# Patient Record
Sex: FEMALE | Race: WHITE | NOT HISPANIC OR LATINO
[De-identification: names, ages, dates, MRNs, and addresses within clinical notes are randomized per-mention and may not be internally consistent; named-entity substitution may affect disease eponyms.]

---

## 2022-12-30 PROBLEM — Z00.00 ENCOUNTER FOR PREVENTIVE HEALTH EXAMINATION: Status: ACTIVE | Noted: 2022-12-30

## 2023-01-04 ENCOUNTER — APPOINTMENT (OUTPATIENT)
Dept: SURGERY | Facility: CLINIC | Age: 33
End: 2023-01-04
Payer: COMMERCIAL

## 2023-01-04 ENCOUNTER — OUTPATIENT (OUTPATIENT)
Dept: OUTPATIENT SERVICES | Facility: HOSPITAL | Age: 33
LOS: 1 days | Discharge: HOME | End: 2023-01-04
Payer: COMMERCIAL

## 2023-01-04 ENCOUNTER — OUTPATIENT (OUTPATIENT)
Dept: OUTPATIENT SERVICES | Facility: HOSPITAL | Age: 33
LOS: 1 days | Discharge: HOME | End: 2023-01-04

## 2023-01-04 VITALS
BODY MASS INDEX: 32.44 KG/M2 | WEIGHT: 190 LBS | TEMPERATURE: 97.5 F | HEIGHT: 64 IN | HEART RATE: 83 BPM | OXYGEN SATURATION: 99 % | SYSTOLIC BLOOD PRESSURE: 122 MMHG | DIASTOLIC BLOOD PRESSURE: 86 MMHG

## 2023-01-04 DIAGNOSIS — R94.31 ABNORMAL ELECTROCARDIOGRAM [ECG] [EKG]: ICD-10-CM

## 2023-01-04 DIAGNOSIS — K80.20 CALCULUS OF GALLBLADDER WITHOUT CHOLECYSTITIS WITHOUT OBSTRUCTION: ICD-10-CM

## 2023-01-04 PROCEDURE — 71046 X-RAY EXAM CHEST 2 VIEWS: CPT | Mod: 26

## 2023-01-04 PROCEDURE — 99203 OFFICE O/P NEW LOW 30 MIN: CPT

## 2023-01-04 PROCEDURE — 93010 ELECTROCARDIOGRAM REPORT: CPT

## 2023-01-05 ENCOUNTER — NON-APPOINTMENT (OUTPATIENT)
Age: 33
End: 2023-01-05

## 2023-01-05 LAB
ALBUMIN SERPL ELPH-MCNC: 4.6 G/DL
ALP BLD-CCNC: 63 U/L
ALT SERPL-CCNC: 18 U/L
ANION GAP SERPL CALC-SCNC: 15 MMOL/L
AST SERPL-CCNC: 17 U/L
BASOPHILS # BLD AUTO: 0.05 K/UL
BASOPHILS NFR BLD AUTO: 0.6 %
BILIRUB DIRECT SERPL-MCNC: <0.2 MG/DL
BILIRUB INDIRECT SERPL-MCNC: >0.2 MG/DL
BILIRUB SERPL-MCNC: 0.4 MG/DL
BUN SERPL-MCNC: 13 MG/DL
CALCIUM SERPL-MCNC: 9.5 MG/DL
CHLORIDE SERPL-SCNC: 103 MMOL/L
CO2 SERPL-SCNC: 20 MMOL/L
CREAT SERPL-MCNC: 0.6 MG/DL
EGFR: 121 ML/MIN/1.73M2
EOSINOPHIL # BLD AUTO: 0.1 K/UL
EOSINOPHIL NFR BLD AUTO: 1.2 %
GLUCOSE SERPL-MCNC: 80 MG/DL
HCT VFR BLD CALC: 42.4 %
HGB BLD-MCNC: 13.5 G/DL
IMM GRANULOCYTES NFR BLD AUTO: 0.2 %
INR PPP: 1.02 RATIO
LYMPHOCYTES # BLD AUTO: 2.47 K/UL
LYMPHOCYTES NFR BLD AUTO: 29.9 %
MAN DIFF?: NORMAL
MCHC RBC-ENTMCNC: 28.5 PG
MCHC RBC-ENTMCNC: 31.8 G/DL
MCV RBC AUTO: 89.5 FL
MONOCYTES # BLD AUTO: 0.61 K/UL
MONOCYTES NFR BLD AUTO: 7.4 %
NEUTROPHILS # BLD AUTO: 5.01 K/UL
NEUTROPHILS NFR BLD AUTO: 60.7 %
PLATELET # BLD AUTO: 272 K/UL
POTASSIUM SERPL-SCNC: 4.4 MMOL/L
PROT SERPL-MCNC: 7.6 G/DL
PT BLD: 11.6 SEC
RBC # BLD: 4.74 M/UL
RBC # FLD: 12.4 %
SODIUM SERPL-SCNC: 138 MMOL/L
WBC # FLD AUTO: 8.26 K/UL

## 2023-01-06 ENCOUNTER — LABORATORY RESULT (OUTPATIENT)
Age: 33
End: 2023-01-06

## 2023-01-06 NOTE — ASU PATIENT PROFILE, ADULT - AS SC BRADEN ACTIVITY
(4) walks frequently Rituxan Pregnancy And Lactation Text: This medication is Pregnancy Category C and it isn't know if it is safe during pregnancy. It is unknown if this medication is excreted in breast milk but similar antibodies are known to be excreted.

## 2023-01-09 ENCOUNTER — TRANSCRIPTION ENCOUNTER (OUTPATIENT)
Age: 33
End: 2023-01-09

## 2023-01-09 ENCOUNTER — OUTPATIENT (OUTPATIENT)
Dept: OUTPATIENT SERVICES | Facility: HOSPITAL | Age: 33
LOS: 1 days | Discharge: HOME | End: 2023-01-09
Payer: COMMERCIAL

## 2023-01-09 ENCOUNTER — RESULT REVIEW (OUTPATIENT)
Age: 33
End: 2023-01-09

## 2023-01-09 ENCOUNTER — APPOINTMENT (OUTPATIENT)
Dept: SURGERY | Facility: HOSPITAL | Age: 33
End: 2023-01-09

## 2023-01-09 VITALS
RESPIRATION RATE: 18 BRPM | OXYGEN SATURATION: 96 % | SYSTOLIC BLOOD PRESSURE: 114 MMHG | HEART RATE: 83 BPM | DIASTOLIC BLOOD PRESSURE: 60 MMHG

## 2023-01-09 VITALS
HEART RATE: 83 BPM | DIASTOLIC BLOOD PRESSURE: 77 MMHG | HEIGHT: 64 IN | TEMPERATURE: 99 F | SYSTOLIC BLOOD PRESSURE: 120 MMHG | OXYGEN SATURATION: 99 % | RESPIRATION RATE: 16 BRPM | WEIGHT: 190.04 LBS

## 2023-01-09 LAB
ABO RH CONFIRMATION: SIGNIFICANT CHANGE UP
BLD GP AB SCN SERPL QL: SIGNIFICANT CHANGE UP

## 2023-01-09 PROCEDURE — 88304 TISSUE EXAM BY PATHOLOGIST: CPT | Mod: 26

## 2023-01-09 PROCEDURE — 47563 LAPARO CHOLECYSTECTOMY/GRAPH: CPT

## 2023-01-09 RX ORDER — ONDANSETRON 8 MG/1
4 TABLET, FILM COATED ORAL ONCE
Refills: 0 | Status: DISCONTINUED | OUTPATIENT
Start: 2023-01-09 | End: 2023-01-09

## 2023-01-09 RX ORDER — SODIUM CHLORIDE 9 MG/ML
1000 INJECTION, SOLUTION INTRAVENOUS
Refills: 0 | Status: DISCONTINUED | OUTPATIENT
Start: 2023-01-09 | End: 2023-01-09

## 2023-01-09 RX ORDER — OXYCODONE HYDROCHLORIDE 5 MG/1
5 TABLET ORAL ONCE
Refills: 0 | Status: DISCONTINUED | OUTPATIENT
Start: 2023-01-09 | End: 2023-01-09

## 2023-01-09 RX ORDER — INDOCYANINE GREEN 25 MG
0.5 KIT INTRAVASCULAR; INTRAVENOUS ONCE
Refills: 0 | Status: DISCONTINUED | OUTPATIENT
Start: 2023-01-09 | End: 2023-01-09

## 2023-01-09 RX ORDER — BUPIVACAINE 13.3 MG/ML
20 INJECTION, SUSPENSION, LIPOSOMAL INFILTRATION ONCE
Refills: 0 | Status: DISCONTINUED | OUTPATIENT
Start: 2023-01-09 | End: 2023-01-09

## 2023-01-09 RX ORDER — HYDROMORPHONE HYDROCHLORIDE 2 MG/ML
0.5 INJECTION INTRAMUSCULAR; INTRAVENOUS; SUBCUTANEOUS
Refills: 0 | Status: DISCONTINUED | OUTPATIENT
Start: 2023-01-09 | End: 2023-01-09

## 2023-01-09 NOTE — BRIEF OPERATIVE NOTE - NSICDXBRIEFPROCEDURE_GEN_ALL_CORE_FT
PROCEDURES:  Laparoscopic cholecystectomy 09-Jan-2023 08:48:19  James Hernandez, transversus abdominis plane, bilateral 09-Jan-2023 08:49:54  James Hernandez

## 2023-01-09 NOTE — PRE-ANESTHESIA EVALUATION ADULT - NSANTHOSAYNRD_GEN_A_CORE
No. LANA screening performed.  STOP BANG Legend: 0-2 = LOW Risk; 3-4 = INTERMEDIATE Risk; 5-8 = HIGH Risk

## 2023-01-09 NOTE — ASU DISCHARGE PLAN (ADULT/PEDIATRIC) - CARE PROVIDER_API CALL
Zhou Epperson (NURA)  Surgery  [address] [place] [address], [address] [place] [address]  Phone: [phone]  Fax: [phone]  Follow Up Time: 2 weeks

## 2023-01-09 NOTE — HISTORY OF PRESENT ILLNESS
[de-identified] : Ms. TERESE PARRY is a 32 year  year old woman. She presents to the office on 01/04/2023 , her primary is Unable to Collect PCP .\par \par ruq pain since summer\par don’t have lfts\par us and mri at [facility] shows [facility]\par cbd i s3 mm\par will scheudle for lap/robo cholecystectomy possible ioc\par \par

## 2023-01-09 NOTE — ASSESSMENT
[FreeTextEntry1] :  Ms. TERESE PARRY is a 32 year  year old woman. 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 We discussed the workup for diagnosis and management. The Post operative care was explained to the patient. He was counselled on diet , exercise and wound care. The Procedure was explained to the patient. The Risk , benefit and alternatives were discussed. We discussed recovery and possible complications. We discussed complications including sever complications like injury of the surrounding organs like the bile duct. We discussed about the benefits and disadvantage of converting the laparoscopic surgery to open. 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 \par We informed that she needs to follow up in OR. \par We also informed that she can call us if anything changes or has any questions.\par

## 2023-01-09 NOTE — ASU DISCHARGE PLAN (ADULT/PEDIATRIC) - NS MD DC FALL RISK RISK
For information on Fall & Injury Prevention, visit: [email or web address] OR  [email or web address] OR  https://www.cdc.gov/steadi/patient.html

## 2023-01-09 NOTE — CHART NOTE - NSCHARTNOTEFT_GEN_A_CORE
PACU ANESTHESIA ADMISSION NOTE      Procedure:   Post op diagnosis:      ____  Intubated  TV:______       Rate: ______      FiO2: ______    _x___  Patent Airway    _x___  Full return of protective reflexes    _x___  Full recovery from anesthesia / back to baseline status    Vitals  SPO2:-98%  HR:-86  RR:-12  B.P:-150/75  TEMP:-98.1    Mental Status:  _x___ Awake   ___x_ Alert   _____ Drowsy   _____ Sedated    Nausea/Vomiting:  _x___  NO       ______Yes,   See Post - Op Orders         Pain Scale (0-10):  __0___    Treatment: _x___ None    __x__ See Post - Op/PCA Orders    Post - Operative Fluids:   ___ Oral   ____x See Post - Op Orders    Plan: Discharge:   _x___Home       _____Floor     _____Critical Care    _____  Other:_________________    Comments:  Report endorsed to RN in pacu  Vitals stable  No anesthesia issues or complications noted.  Discharge to patient to  home when criteria met.

## 2023-01-10 LAB — SURGICAL PATHOLOGY STUDY: SIGNIFICANT CHANGE UP

## 2023-01-12 DIAGNOSIS — K80.10 CALCULUS OF GALLBLADDER WITH CHRONIC CHOLECYSTITIS WITHOUT OBSTRUCTION: ICD-10-CM

## 2023-01-25 ENCOUNTER — APPOINTMENT (OUTPATIENT)
Dept: SURGERY | Facility: CLINIC | Age: 33
End: 2023-01-25
Payer: COMMERCIAL

## 2023-01-25 VITALS
TEMPERATURE: 97.3 F | BODY MASS INDEX: 31.07 KG/M2 | OXYGEN SATURATION: 98 % | HEIGHT: 64 IN | WEIGHT: 182 LBS | HEART RATE: 95 BPM | SYSTOLIC BLOOD PRESSURE: 120 MMHG | DIASTOLIC BLOOD PRESSURE: 84 MMHG

## 2023-01-25 DIAGNOSIS — K80.20 CALCULUS OF GALLBLADDER W/OUT CHOLECYSTITIS W/OUT OBSTRUCTION: ICD-10-CM

## 2023-01-25 PROCEDURE — 99024 POSTOP FOLLOW-UP VISIT: CPT

## 2023-01-25 NOTE — HISTORY OF PRESENT ILLNESS
[de-identified] : Ms. TERESE PARRY is a 32 year  year old woman. She presents to the office on 01/04/2023 , her primary is Unable to Collect PCP .\par \par ruq pain since summer\par don’t have lfts\par us and mri at  shows large [facility]\par cbd i s3 mm\par will scheudle for lap/robo cholecystectomy possible ioc\par 1/25/23: She had lap thomas , she is recovering well. \par she is eating well and having regular bowel movement\par no pain \par \par

## 2023-02-24 NOTE — ASU PATIENT PROFILE, ADULT - NS TRANSFER DENTURES
Please schedule in [facility], best after her colonoscopy scheduled for March 10.  Meanwhile, I will get cardiac CT. n/a

## 2024-01-11 NOTE — ASU PATIENT PROFILE, ADULT - PRO ARRIVE FROM
[Appropriately responsive] : appropriately responsive [Alert] : alert [No Acute Distress] : no acute distress [No Lymphadenopathy] : no lymphadenopathy [Regular Rate Rhythm] : regular rate rhythm [No Murmurs] : no murmurs [Clear to Auscultation B/L] : clear to auscultation bilaterally [Soft] : soft [Non-tender] : non-tender home [Non-distended] : non-distended [No HSM] : No HSM [No Lesions] : no lesions [No Mass] : no mass [Oriented x3] : oriented x3 [Labia Majora] : normal [Labia Minora] : normal [Normal] : normal [Uterine Adnexae] : normal

## 2024-10-02 NOTE — ASU PATIENT PROFILE, ADULT - TEACHING/LEARNING FACTORS INFLUENCE READINESS TO LEARN
Gastroenterology Consult      Inpatient consult to GI  Consult performed by: Kenia Puente CNP  Consult ordered by: Jame Coto MD        Chief Complaint   Anemia  Menorrhagia    History Of Present Illness  Nicki is a 48 year old female presenting with worsening fatigue and exercise Shinal dyspnea.  Patient was found to have a hemoglobin of 4.5.  Patient has had profound menorrhagia since April 2023?  In addition patient has been reporting intermittent dark stools.   In the emergency room she was Hemoccult negative.    Reports intermittent NSAID use since foot surgery in March 2024, Naproxen on average 1-2 times per week. Prior to was on Ibuprofen  Previous iron infusion by hematology  Dark stools x 2 weeks  Denies nausea, vomiting or abdominal pain  Denies change in bowel habits  Started Wegovy few weeks ago  Denies oral iron    Last menstrual cycle early September 2024, due in next few days    Labs  Hemoglobin 4.5 in emergency room post  2 units packed RBCs hemoglobin this morning 7.3  Platelets 210,000  MCV 88.5  INR 0.9  Fibrinogen 278  BUN/creatinine ratio of 12  Iron 13    Past Medical History  Past Medical History:   Diagnosis Date    Anemia, iron deficiency     Insomnia         Surgical History  Past Surgical History:   Procedure Laterality Date    Bunionectomy Right     Dilation and curettage      Polypectomy  07/2023    in uterus        Social History  Social History     Tobacco Use    Smoking status: Never    Smokeless tobacco: Never   Vaping Use    Vaping status: never used   Substance Use Topics    Alcohol use: Yes     Comment: 1-2 wine glasses 3 days a week    Drug use: Never       Family History    Family History   Problem Relation Age of Onset    Hypertension Mother     Diabetes Mother     Hypertension Father     Diabetes Father     Clotting Disorder Father     Diabetes Sister     Hypertension Sister         Allergies  ALLERGIES:  Patient has no known allergies.    Medications  Current  Facility-Administered Medications   Medication Dose Route Frequency Provider Last Rate Last Admin    sodium chloride 0.9% infusion   Intravenous Continuous PRN Jame Coto MD        benzonatate (TESSALON PERLES) capsule 100 mg  100 mg Oral TID PRN Jame Coto MD        hydrALAZINE (APRESOLINE) injection 10 mg  10 mg Intravenous Q6H PRN Jame Coto MD        ondansetron (ZOFRAN) injection 4 mg  4 mg Intravenous Q4H PRN Jame Coto MD        Potassium Standard Replacement Protocol (Levels 3.5 and lower)   Does not apply See Admin Instructions Jame Coto MD        Potassium Replacement (Levels 3.6 - 4)   Does not apply See Admin Instructions Jame Coto MD        Magnesium Standard Replacement Protocol   Does not apply See Admin Instructions Jame Coto MD        Phosphorus Standard Replacement Protocol   Does not apply See Admin Instructions Jame Coto MD        acetaminophen (TYLENOL) tablet 650 mg  650 mg Oral Q4H PRN Jame Coto MD        polyethylene glycol (MIRALAX) packet 17 g  17 g Oral Daily PRN Jame Coto MD        docusate sodium-sennosides (SENOKOT S) 50-8.6 MG 2 tablet  2 tablet Oral Daily PRN Jame Coto MD        bisacodyl (DULCOLAX) suppository 10 mg  10 mg Rectal Daily PRN Jame Coto MD        sodium chloride 0.9 % flush bag 25 mL  25 mL Intravenous PRN Jame Coto MD        traZODone (DESYREL) tablet 75 mg  75 mg Oral QHS PRN Jame Coto MD   50 mg at 10/02/24 0012     Current Outpatient Medications   Medication Sig Dispense Refill    Wegovy 0.25 MG/0.5ML injection INJECT 0.25 MG SUBCUTANEOUS ONCE A WEEK      vitamin D3 (CHOLECALCIFEROL) 1.25 mg(50,000 units) capsule Take 1.25 mg by mouth 1 day a week.      Wheat Dextrin (BENEFIBER PO) Take 1 tablet by mouth daily.      COLLAGEN PO Take 1 Scoop by mouth daily.      naproxen (NAPROSYN) 500 MG tablet Take 500 mg by mouth as needed for Pain.      trazodone (DESYREL) 150 MG tablet Take 150 mg by mouth nightly.          Review of  Systems  Review of Systems   Constitutional:  Positive for activity change and fatigue. Negative for appetite change, chills, diaphoresis, fever and unexpected weight change.   HENT:  Negative for dental problem, nosebleeds, postnasal drip, sore throat, trouble swallowing and voice change.    Eyes:  Negative for photophobia, redness and visual disturbance.   Respiratory:  Positive for shortness of breath. Negative for cough, choking and chest tightness.    Cardiovascular:  Negative for chest pain, palpitations and leg swelling.   Gastrointestinal:  Negative for abdominal distention, abdominal pain, anal bleeding, blood in stool, constipation, diarrhea, nausea, rectal pain and vomiting.   Endocrine: Negative for cold intolerance and heat intolerance.   Genitourinary:  Negative for difficulty urinating, dysuria, flank pain, frequency and urgency.   Musculoskeletal:  Negative for arthralgias, back pain, gait problem, joint swelling, myalgias, neck pain and neck stiffness.   Skin:  Negative for color change, pallor, rash and wound.   Allergic/Immunologic: Negative for environmental allergies, food allergies and immunocompromised state.   Neurological:  Negative for dizziness, seizures, syncope, speech difficulty, weakness, light-headedness, numbness and headaches.   Hematological:  Negative for adenopathy. Does not bruise/bleed easily.   Psychiatric/Behavioral:  Negative for agitation, behavioral problems, confusion, decreased concentration, hallucinations, sleep disturbance and suicidal ideas. The patient is not nervous/anxious.         Physical Exam  Physical Exam  Vitals reviewed.   Constitutional:       Appearance: Normal appearance. She is normal weight.   HENT:      Head: Normocephalic and atraumatic.      Mouth/Throat:      Mouth: Mucous membranes are moist.      Pharynx: Oropharynx is clear.      Neck: Normal range of motion.   Eyes:      Conjunctiva/sclera: Conjunctivae normal.      Pupils: Pupils are equal,  round, and reactive to light.   Cardiovascular:      Rate and Rhythm: Normal rate and regular rhythm.      Pulses: Normal pulses.   Pulmonary:      Effort: Pulmonary effort is normal.      Breath sounds: Normal breath sounds.   Abdominal:      General: Abdomen is flat. Bowel sounds are normal. There is no distension.      Palpations: Abdomen is soft.      Tenderness: There is no abdominal tenderness. There is no guarding.   Musculoskeletal:         General: Normal range of motion.   Skin:     General: Skin is warm and dry.      Coloration: Skin is pale.   Neurological:      General: No focal deficit present.      Mental Status: She is alert and oriented to person, place, and time.   Psychiatric:         Mood and Affect: Mood normal.           Last Recorded Vitals  Visit Vitals  BP 99/64 (BP Location: LUE - Left upper extremity, Patient Position: Semi-Stewart's)   Pulse (!) 102   Temp 98.8 °F (37.1 °C) (Oral)   Resp (!) 11   Ht 5' 5\" (1.651 m)   Wt 73.2 kg (161 lb 6 oz)   LMP 09/01/2024 (Approximate)   SpO2 100%   BMI 26.85 kg/m²       Labs  Admission on 10/01/2024   Component Date Value Ref Range Status    Ventricular Rate EKG/Min (BPM) 10/01/2024 106   Final    Atrial Rate (BPM) 10/01/2024 106   Final    HI-Interval (MSEC) 10/01/2024 140   Final    QRS-Interval (MSEC) 10/01/2024 74   Final    QT-Interval (MSEC) 10/01/2024 352   Final    QTc 10/01/2024 467   Final    P Axis (Degrees) 10/01/2024 54   Final    R Axis (Degrees) 10/01/2024 22   Final    T Axis (Degrees) 10/01/2024 26   Final    REPORT TEXT 10/01/2024    Final                    Value:Sinus tachycardia  Otherwise normal ECG  No previous ECGs available  Confirmed by MARTI BRADY MD ([id number]) on 10/1/2024 3:09:30 PM      Sodium 10/01/2024 138  135 - 145 mmol/L Final    Potassium 10/01/2024 3.6  3.4 - 5.1 mmol/L Final    Chloride 10/01/2024 110  97 - 110 mmol/L Final    Carbon Dioxide 10/01/2024 21  21 - 32 mmol/L Final    Anion Gap 10/01/2024 11  7 - 19 mmol/L  Final    Glucose 10/01/2024 107 (H)  70 - 99 mg/dL Final    BUN 10/01/2024 15  6 - 20 mg/dL Final    Creatinine 10/01/2024 0.72  0.51 - 0.95 mg/dL Final    Glomerular Filtration Rate 10/01/2024 >90  >=60 Final    eGFR results = or >60 mL/min/1.73m2 = Normal kidney function. Estimated GFR calculated using the CKD-EPI-R (2021) equation that does not include race in the creatinine calculation.    BUN/Cr 10/01/2024 21  7 - 25 Final    Calcium 10/01/2024 8.6  8.4 - 10.2 mg/dL Final    Bilirubin, Total 10/01/2024 0.7  0.2 - 1.0 mg/dL Final    GOT/AST 10/01/2024 16  <=37 Units/L Final    GPT/ALT 10/01/2024 20  <64 Units/L Final    Alkaline Phosphatase 10/01/2024 37 (L)  45 - 117 Units/L Final    Low Alkaline Phosphatase results may indicate hypophosphatasia, malnutrition, hypothyroidism, or other disease states. Correlate with clinical symptoms.    Albumin 10/01/2024 3.3 (L)  3.4 - 5.0 g/dL Final    Protein, Total 10/01/2024 6.0 (L)  6.4 - 8.2 g/dL Final    Globulin 10/01/2024 2.7  2.0 - 4.0 g/dL Final    A/G Ratio 10/01/2024 1.2  1.0 - 2.4 Final    Troponin I, High Sensitivity 10/01/2024 9  <52 ng/L Final    WBC 10/01/2024 6.0  4.2 - 11.0 K/mcL Final    RBC 10/01/2024 1.59 (L)  4.00 - 5.20 mil/mcL Final    HGB 10/01/2024 4.5 (LL)  12.0 - 15.5 g/dL Final    This result has been called to Skyler Turner RN by Darnell on 10/01/2024 14:38:10, and has been read back.     HCT 10/01/2024 14.1 (L)  36.0 - 46.5 % Final    MCV 10/01/2024 88.7  78.0 - 100.0 fl Final    MCH 10/01/2024 28.3  26.0 - 34.0 pg Final    MCHC 10/01/2024 31.9 (L)  32.0 - 36.5 g/dL Final    RDW-CV 10/01/2024 14.8  11.0 - 15.0 % Final    RDW-SD 10/01/2024 47.2  39.0 - 50.0 fL Final    PLT 10/01/2024 251  140 - 450 K/mcL Final    NRBC 10/01/2024 0  <=0 /100 WBC Final    Neutrophil, Percent 10/01/2024 61  % Final    Lymphocytes, Percent 10/01/2024 30  % Final    Mono, Percent 10/01/2024 7  % Final    Eosinophils, Percent 10/01/2024 1  % Final     Basophils, Percent 10/01/2024 1  % Final    Immature Granulocytes 10/01/2024 0  % Final    Absolute Neutrophils 10/01/2024 3.7  1.8 - 7.7 K/mcL Final    Absolute Lymphocytes 10/01/2024 1.8  1.0 - 4.8 K/mcL Final    Absolute Monocytes 10/01/2024 0.4  0.3 - 0.9 K/mcL Final    Absolute Eosinophils  10/01/2024 0.0  0.0 - 0.5 K/mcL Final    Absolute Basophils 10/01/2024 0.0  0.0 - 0.3 K/mcL Final    Absolute Immature Granulocytes 10/01/2024 0.0  0.0 - 0.2 K/mcL Final    ABO/RH(D) 10/01/2024 A Rh Positive   Final    ANTIBODY SCREEN 10/01/2024 Negative   Final    TYPE AND SCREEN EXPIRATION DATE 10/01/2024 10/04/2024 23:59   Final    Extra Tube 10/01/2024 Hold for Add Ons   Final    Extra Tube 10/01/2024 Hold for Add Ons   Final    Iron 10/01/2024 13 (L)  50 - 170 mcg/dL Final    Iron Binding Capacity 10/01/2024 326  250 - 450 mcg/dL Final    Iron, Percent Saturation 10/01/2024 4 (L)  15 - 45 % Final    Protime- PT 10/01/2024 10.3  9.7 - 11.8 sec Final    INR 10/01/2024 0.9    Final    INR Therapeutic Range: 2.0 to 3.0 (2.5 to 3.5 recommended for recurrent thrombotic episodes and mechanical prosthetic heart valves.)    PTT 10/01/2024 <21 (L)  22 - 32 sec Final    Fibrinogen 10/01/2024 278  190 - 425 mg/dL Final    UNIT BLOOD TYPE 10/01/2024 A Pos   Preliminary    ISBT BLOOD TYPE 10/01/2024 6200   Preliminary    BLOOD EXPIRATION DATE 10/01/2024 13549377641826   Preliminary    UNIT NUMBER 10/01/2024 [id number]   Preliminary    DISPENSE STATUS 10/01/2024 Issued   Preliminary    PRODUCT ID 10/01/2024 Red Blood Cells   Preliminary    PRODUCT CODE 10/01/2024 [id number]   Preliminary    PRODUCT DESCRIPTION 10/01/2024 RBC AS-1 LR   Preliminary    CROSSMATCH RESULT 10/01/2024 Compatible   Preliminary    ISSUE DATE/TIME 10/01/2024 [id number]   Preliminary    UNIT BLOOD TYPE 10/01/2024 O Neg   Preliminary    ISBT BLOOD TYPE 10/01/2024 9500   Preliminary    BLOOD EXPIRATION DATE 10/01/2024 [id number]   Preliminary    UNIT  NUMBER 10/01/2024 [id number]   Preliminary    DISPENSE STATUS 10/01/2024 Issued   Preliminary    PRODUCT ID 10/01/2024 Red Blood Cells   Preliminary    PRODUCT CODE 10/01/2024 [id number]   Preliminary    PRODUCT DESCRIPTION 10/01/2024 RBC AS-1 LR   Preliminary    CROSSMATCH RESULT 10/01/2024 Compatible   Preliminary    ISSUE DATE/TIME 10/01/2024 [id number]   Preliminary    Extra Tube 10/01/2024 Hold for Add Ons   Final    Sodium 10/02/2024 141  135 - 145 mmol/L Final    Potassium 10/02/2024 3.5  3.4 - 5.1 mmol/L Final    Chloride 10/02/2024 112 (H)  97 - 110 mmol/L Final    Carbon Dioxide 10/02/2024 22  21 - 32 mmol/L Final    Anion Gap 10/02/2024 11  7 - 19 mmol/L Final    Glucose 10/02/2024 99  70 - 99 mg/dL Final    BUN 10/02/2024 9  6 - 20 mg/dL Final    Creatinine 10/02/2024 0.75  0.51 - 0.95 mg/dL Final    Glomerular Filtration Rate 10/02/2024 >90  >=60 Final    eGFR results = or >60 mL/min/1.73m2 = Normal kidney function. Estimated GFR calculated using the CKD-EPI-R (2021) equation that does not include race in the creatinine calculation.    BUN/Cr 10/02/2024 12  7 - 25 Final    Calcium 10/02/2024 8.0 (L)  8.4 - 10.2 mg/dL Final    WBC 10/02/2024 4.8  4.2 - 11.0 K/mcL Final    RBC 10/02/2024 2.44 (L)  4.00 - 5.20 mil/mcL Final    HGB 10/02/2024 7.3 (L)  12.0 - 15.5 g/dL Final    HCT 10/02/2024 21.6 (L)  36.0 - 46.5 % Final    MCV 10/02/2024 88.5  78.0 - 100.0 fl Final    MCH 10/02/2024 29.9  26.0 - 34.0 pg Final    MCHC 10/02/2024 33.8  32.0 - 36.5 g/dL Final    RDW-CV 10/02/2024 14.6  11.0 - 15.0 % Final    RDW-SD 10/02/2024 47.4  39.0 - 50.0 fL Final    PLT 10/02/2024 210  140 - 450 K/mcL Final    NRBC 10/02/2024 0  <=0 /100 WBC Final    Neutrophil, Percent 10/02/2024 47  % Final    Lymphocytes, Percent 10/02/2024 41  % Final    Mono, Percent 10/02/2024 9  % Final    Eosinophils, Percent 10/02/2024 2  % Final    Basophils, Percent 10/02/2024 1  % Final    Immature Granulocytes 10/02/2024 0  % Final     Absolute Neutrophils 10/02/2024 2.3  1.8 - 7.7 K/mcL Final    Absolute Lymphocytes 10/02/2024 2.0  1.0 - 4.8 K/mcL Final    Absolute Monocytes 10/02/2024 0.5  0.3 - 0.9 K/mcL Final    Absolute Eosinophils  10/02/2024 0.1  0.0 - 0.5 K/mcL Final    Absolute Basophils 10/02/2024 0.0  0.0 - 0.3 K/mcL Final    Absolute Immature Granulocytes 10/02/2024 0.0  0.0 - 0.2 K/mcL Final       Imaging  LAST MRI:  === 12/04/23 ===    MRI BRAIN W WO CONTRAST    - Narrative -  EXAMINATION: MRI BRAIN W WO CONTRAST    HISTORY: 47 years Female left-sided weakness. Blurry vision. Numbness and  tingling.    TECHNIQUE: MRI of the brain was performed before and after administration  of intravenous contrast. 7.2 mL Gadavist was administered intravenously.    COMPARISON: None available.    FINDINGS:    Ventricles and sulci are normal in size. No midline shift or hydrocephalus.  Basal cisterns are patent.    No extra-axial collection.  No abnormal parenchymal signal intensity. No  restricted diffusion to suggest acute infarct. No abnormal intracranial  enhancement. No intra-axial or extra-axial mass. No evidence of acute or  chronic hemorrhage.  Flow voids of the proximal intracranial arteries and  dural sinuses are present.    No acute orbital abnormality. No fluid is seen in the paranasal sinuses or  mastoid air cells. The calvarium and visualized cervical spine appear  normal. Craniocervical junction and midline structures are normal.    - Impression -  No acute intracranial abnormality.    Electronically Signed by: JUAN CARO M.D.  Signed on: 12/12/2023 8:20 AM  Workstation ID: [id number]    LAST CT:  === 05/11/17 ===    - Narrative -  Accession #    [id number]    EXAM: CT CHEST PULM EMBOLISM W CON    CLINICAL INDICATION: Chest pain    COMPARISON:  Today's chest radiograph    TECHNIQUE: CTPA with post processed MIPS after IV 60 cc Omnipaque    FINDINGS:  No thromboembolic disease  Normal heart size  No adenopathy or  effusions  Clear lungs, normal pleura  No fracture or bone lesion    - Impression -  No PE        Transcribed By: JOSE  05/11/17 11:04 pm    Dictated By:            CONRADO VIVAR MD    Electronically Reviewed and Approved By:           CONRADO VIVAR MD  05/11/17 11:07 pm    LAST U/S:  === 02/28/23 ===    US PELVIS NON-OB EXTERNAL TRANSABDOMINAL AND INTERNAL TRANSVAGINAL    - Narrative -  ULTRASOUND EVALUATION OF THE FEMALE PELVIS: 2/28/2023 3:42 PM    CLINICAL HISTORY: 46 years of age, Female, suprapubic pain.    COMPARISON: None.    PROCEDURE COMMENTS: Real-time sonographic images of the pelvis were  obtained transabdominally and transvaginally utilizing grayscale, color,  and Doppler imaging.    FINDINGS:    Uterus:  Appearance: Normal.  Position: Anteverted.  Size: 10.2 x 6.1 x 5.3 cm.  Endometrial stripe: 6 mm.    Right ovary:  Size: 3.1 x 3.6 x 1.7 cm.  Appearance: Normal morphology. No masses. Arterial and venous flow present.  There is a dominant follicle measuring 1.8 cm.    Left ovary:  Size: 2.2 x 2.2 x 1.4 cm.  Appearance: Normal morphology. No masses. Arterial and venous flow present.    Free fluid: None    - Impression -  1.   Normal ultrasound evaluation of the female pelvis.          Electronically Signed by: CRISTINO GOMES M.D.  Signed on: 3/2/2023 12:42 PM      Assessment  Anemia, drop in hgb, history of menorrhagia -due for next menstrual cycle in next few days, dark stools x 2 weeks, NSAID use  Menorrhagia    PLAN  Ferritin, B12 and folate  EGD/colonoscopy tomorrow am  Pantoprazole 40 mg per mouth daily  Gynecology consult    Kenia Puente, [place]  10/2/2024         none

## 2025-06-08 ENCOUNTER — EMERGENCY (EMERGENCY)
Facility: HOSPITAL | Age: 35
LOS: 0 days | Discharge: HOME | End: 2025-06-08
Attending: EMERGENCY MEDICINE
Payer: COMMERCIAL

## 2025-06-08 VITALS
DIASTOLIC BLOOD PRESSURE: 90 MMHG | TEMPERATURE: 98 F | WEIGHT: 190.04 LBS | RESPIRATION RATE: 20 BRPM | OXYGEN SATURATION: 98 % | SYSTOLIC BLOOD PRESSURE: 152 MMHG | HEART RATE: 80 BPM
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
## 2025-06-08 DIAGNOSIS — T23.261A BURN OF SECOND DEGREE OF BACK OF RIGHT HAND, INITIAL ENCOUNTER: ICD-10-CM

## 2025-06-08 DIAGNOSIS — T31.0 BURNS INVOLVING LESS THAN 10% OF BODY SURFACE: ICD-10-CM

## 2025-06-08 DIAGNOSIS — R51.9 HEADACHE, UNSPECIFIED: ICD-10-CM

## 2025-06-08 PROCEDURE — 99284 EMERGENCY DEPT VISIT MOD MDM: CPT | Mod: 25

## 2025-06-08 PROCEDURE — 16020 DRESS/DEBRID P-THICK BURN S: CPT

## 2025-06-08 PROCEDURE — 99285 EMERGENCY DEPT VISIT HI MDM: CPT | Mod: 25

## 2025-06-08 RX ORDER — SILVER SULFADIAZINE 1 %
1 CREAM (GRAM) TOPICAL ONCE
Refills: 0 | Status: COMPLETED | OUTPATIENT
Start: 2025-06-08 | End: 2025-06-08

## 2025-06-08 RX ORDER — IBUPROFEN 200 MG
600 TABLET ORAL ONCE
Refills: 0 | Status: COMPLETED | OUTPATIENT
Start: 2025-06-08 | End: 2025-06-08

## 2025-06-08 RX ADMIN — Medication 1 APPLICATION(S): at 08:16

## 2025-06-08 RX ADMIN — Medication 600 MILLIGRAM(S): at 08:16
